# Patient Record
(demographics unavailable — no encounter records)

---

## 2025-03-27 NOTE — PHYSICAL EXAM
[FreeTextEntry3] : FPST 5 vertex and frontal scalp: diffuse hair thinning with perifollicular. scaling and whitening and loss of follicular ostia.  Hair on vertex scalp short and hair pull test +  +many miniaturized hair

## 2025-03-27 NOTE — HISTORY OF PRESENT ILLNESS
[FreeTextEntry1] : NPV - Hair loss [de-identified] : Sharee Martin 60y/o F presents for hair loss.  Says hair became very fragile and stopped growing since 10/2023. Mostly on the top and crown of scalp. Has a lot of itching.  Thinks started after getting zackery that were too tight on scalp at salon.  Also mentions episodes such as getting hair heated at a [employer] salon and smelled burnt after and getting hair damage from box dye. Very tearful during encounter. Hair loss has taken huge emotional toll on her. PMH: MICHAEL (dx 1985) on xeljanz since at least 2020 RA-? rheum  PHx of skin cancer: no FHx of skin cancer: no H/x of blistering sunburns: no H/x of tanning bed use: no Uses sunscreen regularly:yes

## 2025-03-27 NOTE — ASSESSMENT
[FreeTextEntry1] : #Alopecia, concern for scarring Discussed with the patient possible underlying etiologies including CCCA, LPP, AGA, AA, systemic dz Discussed hair loss often multifactorial   - Ordered CBC, TSH w/reflex T4, ferritin, Vitamin D. - Biopsy x2 today - start clobetasol .05% solution bid for two weeks on and two weeks off as needed for itching.  A) Biopsy by 4mm Punch Technique Procedure Note.  (horizontal section) Location: R parietal anterior  After discussion of risks, verbal consent was obtained and a time out was performed.  The area was cleaned with an alcohol swab.  Anesthesia: 1% lidocaine with 1:100,000 epinephrine.  Closure with 4-0 Prolene interrupted suture.  Specimen was sent for pathologic examination.  Wound care was reviewed with the patient.  Will contact patient with biopsy results.   B) Biopsy by 4mm Punch Technique Procedure Note.  (vertical section) Location: R parietal posterior.  After discussion of risks, verbal consent was obtained and a time out was performed.  The area was cleaned with an alcohol swab.  Anesthesia: 1% lidocaine with 1:100,000 epinephrine.  Closure with 4-0 Prolene interrupted suture.  Specimen was sent for pathologic examination.  Wound care was reviewed with the patient.  Will contact patient with biopsy results.  RTC 2 weeks for suture removal and review results

## 2025-03-27 NOTE — HISTORY OF PRESENT ILLNESS
[FreeTextEntry1] : NPV - Hair loss [de-identified] : Sharee Martin 60y/o F presents for hair loss.  Says hair became very fragile and stopped growing since 10/2023. Mostly on the top and crown of scalp. Has a lot of itching.  Thinks started after getting zackery that were too tight on scalp at salon.  Also mentions episodes such as getting hair heated at a [employer] salon and smelled burnt after and getting hair damage from box dye. Very tearful during encounter. Hair loss has taken huge emotional toll on her. PMH: MICHAEL (dx 1985) on xeljanz since at least 2020 RA-? rheum  PHx of skin cancer: no FHx of skin cancer: no H/x of blistering sunburns: no H/x of tanning bed use: no Uses sunscreen regularly:yes

## 2025-04-09 NOTE — ASSESSMENT
[FreeTextEntry1] : #CCCA #TE #AGA 3/27/25 punch bx parietal scalp  x2: -Multifactorial alopecia, features of CCCA, TE, AGA.  Counselling about the chronic nature of CCCA and AGA was provided. Counseling about TE provided - Ordered CBC, TSH w/reflex T4, ferritin, Vitamin D. - still needs to complete -emphasized importance of avoiding all traumatic hair practices completely including high heat, chemical relaxation. - continue clobetasol .05% solution bid for two weeks on and two weeks off as needed for itching. -start finasteride 2.5mg daily. SED -start doxycycline 100mg bid x 12 weeks . SED -start topical minoxidil 5% foam/solution daily x at least 6 month trial. instruction handout provided -ILK today   Intralesional Kenalog #1- Procedure Note Verbal consent was obtained from the patient. The risks of bleeding, infection, atrophy, and hypopigmentation were reviewed with the patient. 20 lesion located on the vertex and occipital scalp was injected with 5 mg/cc of Kenalog, for a total volume of 2.0 cc.  RTC 6 week for ILK

## 2025-04-09 NOTE — HISTORY OF PRESENT ILLNESS
[FreeTextEntry1] : RPV - S/R and Bx Results [de-identified] : Sharee Martin 60 y/o F presents for S/R and to review bx results.  - Bx sites are healing well --------------- LV 03/27/25 Sharee Martin 62y/o F presents for hair loss.  Says hair became very fragile and stopped growing since 10/2023. Mostly on the top and crown of scalp. Has a lot of itching.  Thinks started after getting zackery that were too tight on scalp at salon.  Also mentions episodes such as getting hair heated at a [employer] salon and smelled burnt after and getting hair damage from box dye. Very tearful during encounter. Hair loss has taken huge emotional toll on her. PMH: MICHAEL (dx 1985) on xeljanz since at least 2020 RA-? rheum  PHx of skin cancer: no FHx of skin cancer: no H/x of blistering sunburns: no H/x of tanning bed use: no Uses sunscreen regularly:yes

## 2025-05-21 NOTE — ASSESSMENT
[FreeTextEntry1] : This is a 61-year-old female with ulcerative colitis diagnosed in 1985.  We have never managed to do a colonoscopy on her although she had previously been on Xeljanz.  Her last colonoscopy was done in 2019 by Dr. Goldberg who reports inflammation in the left side of the colon that was moderate in nature.  Currently she is in an ulcerative colitis exacerbation and I have recommended the following - Prednisone start at 40 mg/day and taper only when she is in a clinical remission. -Prebiologic testing -We discussed options for biologic exposure which include vedolizumab and ustekinumab.  It is not clear that she is a candidate for Ravin inhibitor if she has not failed a TNF drug but she has already been exposed to one and very likely would respond to that.   -Calcium and vitamin D. Follow-up in 4 weeks to discuss initiation of biologic therapy.

## 2025-05-21 NOTE — HISTORY OF PRESENT ILLNESS
[FreeTextEntry1] : This is the patient we last saw in 2022 who returns to us now feeling poorly.  This is a 61-year-old woman with ulcerative colitis diagnosed in 1985.  She had been on Xeljanz 5 twice daily but reports to me that she stopped this treatment 4 years ago.  She had not previously been on a biologic and she must have been prescribed during a window in which there was no FDA requirement to fail an anti-TNF therapy before exposure to a Ravin inhibitor.  After she stopped her treatment 4 years ago she says that she was in remission and did not really have any issues.  Her desire to avoid the cost of treatment was also an issue as at the time she had a period where she was out of work and did not have insurance.  Prior to that she was also a challenge for our practices she did not keep her appointments and there was a question of compliance with medication.  Currently she reports 1 month of flare symptoms including abdominal pain 6 times a day bloody stools nighttime stooling including blood up to 2 times per night.  She is lost weight approximately 10 pounds due to decreased appetite.  She has been on doxycycline for question hair loss.  Review of symptoms includes hair loss  Allergies include sulfa